# Patient Record
Sex: MALE | Race: WHITE
[De-identification: names, ages, dates, MRNs, and addresses within clinical notes are randomized per-mention and may not be internally consistent; named-entity substitution may affect disease eponyms.]

---

## 2020-06-02 ENCOUNTER — HOSPITAL ENCOUNTER (INPATIENT)
Dept: HOSPITAL 96 - M.ERS | Age: 40
LOS: 6 days | Discharge: HOME | DRG: 603 | End: 2020-06-08
Attending: FAMILY MEDICINE | Admitting: FAMILY MEDICINE
Payer: COMMERCIAL

## 2020-06-02 VITALS — BODY MASS INDEX: 27.83 KG/M2 | HEIGHT: 72.99 IN | WEIGHT: 210 LBS

## 2020-06-02 VITALS — DIASTOLIC BLOOD PRESSURE: 81 MMHG | SYSTOLIC BLOOD PRESSURE: 123 MMHG

## 2020-06-02 VITALS — SYSTOLIC BLOOD PRESSURE: 129 MMHG | DIASTOLIC BLOOD PRESSURE: 93 MMHG

## 2020-06-02 DIAGNOSIS — L97.519: ICD-10-CM

## 2020-06-02 DIAGNOSIS — F17.210: ICD-10-CM

## 2020-06-02 DIAGNOSIS — L03.116: ICD-10-CM

## 2020-06-02 DIAGNOSIS — B35.3: ICD-10-CM

## 2020-06-02 DIAGNOSIS — B95.61: ICD-10-CM

## 2020-06-02 DIAGNOSIS — B95.0: ICD-10-CM

## 2020-06-02 DIAGNOSIS — R00.0: ICD-10-CM

## 2020-06-02 DIAGNOSIS — E87.1: ICD-10-CM

## 2020-06-02 DIAGNOSIS — E11.621: ICD-10-CM

## 2020-06-02 DIAGNOSIS — E11.65: ICD-10-CM

## 2020-06-02 DIAGNOSIS — D72.829: ICD-10-CM

## 2020-06-02 DIAGNOSIS — L03.115: Primary | ICD-10-CM

## 2020-06-02 LAB
ABSOLUTE BASOPHILS: 0.1 THOU/UL (ref 0–0.2)
ABSOLUTE EOSINOPHILS: 0.4 THOU/UL (ref 0–0.7)
ABSOLUTE MONOCYTES: 1.1 THOU/UL (ref 0–1.2)
ALBUMIN SERPL-MCNC: 3.6 G/DL (ref 3.4–5)
ALP SERPL-CCNC: 74 U/L (ref 46–116)
ALT SERPL-CCNC: 26 U/L (ref 30–65)
ANION GAP SERPL CALC-SCNC: 7 MMOL/L (ref 7–16)
AST SERPL-CCNC: 17 U/L (ref 15–37)
BASOPHILS NFR BLD AUTO: 0.3 %
BILIRUB SERPL-MCNC: 0.7 MG/DL
BUN SERPL-MCNC: 14 MG/DL (ref 7–18)
CALCIUM SERPL-MCNC: 9 MG/DL (ref 8.5–10.1)
CHLORIDE SERPL-SCNC: 99 MMOL/L (ref 98–107)
CO2 SERPL-SCNC: 29 MMOL/L (ref 21–32)
CREAT SERPL-MCNC: 1.1 MG/DL (ref 0.6–1.3)
EOSINOPHIL NFR BLD: 2.3 %
GLUCOSE SERPL-MCNC: 167 MG/DL (ref 70–99)
GRANULOCYTES NFR BLD MANUAL: 78.7 %
HCT VFR BLD CALC: 49.6 % (ref 42–52)
HGB BLD-MCNC: 17.5 GM/DL (ref 14–18)
LYMPHOCYTES # BLD: 2 THOU/UL (ref 0.8–5.3)
LYMPHOCYTES NFR BLD AUTO: 12 %
MCH RBC QN AUTO: 31.5 PG (ref 26–34)
MCHC RBC AUTO-ENTMCNC: 35.3 G/DL (ref 28–37)
MCV RBC: 89.2 FL (ref 80–100)
MONOCYTES NFR BLD: 6.7 %
MPV: 7.9 FL. (ref 7.2–11.1)
NEUTROPHILS # BLD: 13.1 THOU/UL (ref 1.6–8.1)
NUCLEATED RBCS: 0 /100WBC
PLATELET COUNT*: 338 THOU/UL (ref 150–400)
POTASSIUM SERPL-SCNC: 3.9 MMOL/L (ref 3.5–5.1)
PROT SERPL-MCNC: 8.4 G/DL (ref 6.4–8.2)
RBC # BLD AUTO: 5.56 MIL/UL (ref 4.5–6)
RDW-CV: 13.8 % (ref 10.5–14.5)
SODIUM SERPL-SCNC: 135 MMOL/L (ref 136–145)
WBC # BLD AUTO: 16.6 THOU/UL (ref 4–11)

## 2020-06-03 VITALS — DIASTOLIC BLOOD PRESSURE: 70 MMHG | SYSTOLIC BLOOD PRESSURE: 133 MMHG

## 2020-06-03 VITALS — DIASTOLIC BLOOD PRESSURE: 73 MMHG | SYSTOLIC BLOOD PRESSURE: 121 MMHG

## 2020-06-03 NOTE — NUR
PATIENT RESTING IN BED. PATIENT HAS COMPLAINTS OF PAIN TO FEET, TREATED
ADEQUATELY WITH HYDROCODONE. PATIENT USES URINAL. PATIENT HAS GOOD APPETITE.
PATIENT DENIES ANY NEEDS AT THIS TIME. CALL LIGHT WITHIN REACH.

## 2020-06-03 NOTE — NUR
PATIENT UP FROM ER TO ROOM 119.  PT ALERT/ORIENTED X4, PLEASANT.  PT SAID
WOUNDS ON FEET STARTED THREE DAYS AGO AND PAIN BECAME INTOLERABLE SO CAM TO
HOSPITAL.  RT FOOT WITH RED, WARM EDEMA ON ANKLE AND TOP OF FOOT.  BOTTOM OF
FOOOT (BALL OF FOOT) WITH GREEN/YELLOW PURULENT WEEPING DRAINAGE.  LT FOOT
WITH WHAT APPEARS TO BE AN ATHELETE'S FOOT FUNGUS WITH GREEN/YELLOW WEEPING
DRAINAGE BETWEEN TOES.  FEET AND TOES CLEANED WITH WOUND SPRAY, PATTED DRY
WITH 4X4 GAUZE AND GAUZE PLACED OVER BALL OF FOOT ON RT FOOT, WRAPPED WITH
FLUFF GAUZE.  LT FOOT CLEANED WITH WOUND SPRAY, PATTED DRY AND DRIED BETWEEN
TOES.  4X4 GAUZE FOLDED AND PLACED BETWEEN DOES, WRAPPED WITH FLUFF GAUZE.  PT
INSTRUCTED TO CALL FOR ASSISTANCE PRIOR TO GETTING OUT OF BED.  PT ORIENTED TO
ROOM/POLICIES AND VERBALIZES UNDERSTANDING.  PT GIVEN TORADOL IN ER BUT DENIES
NEEDS FOR PAIN MEDS AT THIS TIME.  WILL CONTINUE TO MONITOR.

## 2020-06-04 VITALS — DIASTOLIC BLOOD PRESSURE: 82 MMHG | SYSTOLIC BLOOD PRESSURE: 136 MMHG

## 2020-06-04 VITALS — DIASTOLIC BLOOD PRESSURE: 79 MMHG | SYSTOLIC BLOOD PRESSURE: 121 MMHG

## 2020-06-04 VITALS — SYSTOLIC BLOOD PRESSURE: 133 MMHG | DIASTOLIC BLOOD PRESSURE: 84 MMHG

## 2020-06-04 LAB
EST. AVERAGE GLUCOSE BLD GHB EST-MCNC: 108 MG/DL
GLYCOHEMOGLOBIN (HGB A1C): 5.4 % (ref 4.8–5.6)

## 2020-06-04 NOTE — CON
[address]  [address]                    CONSULTATION                  
_______________________________________________________________________________
 
Name:       JOSE RAFAEL ALICEA                Room:           [address] [place]  
M.R.#:  [id number]      Account #:      [id number]  
Admission:  06/02/20     Attend Phys:    Jeanette Corcoran
Discharge:               Date of Birth:  [birth year]  
         Report #: [id number]
                                                                     [id number]  
_______________________________________________________________________________
THIS REPORT FOR:  //name//                      
 
cc:  MARGARETH Higuera family physician/PCP 
     MARGARETH Higuera family physician/PCP                                        ~
THIS REPORT FOR:  //name//                      
 
CC: MARGARETH physician/PCP
    Jeanette Anaya
 
DATE OF SERVICE:  06/03/2020
 
 
INFECTIOUS DISEASE CONSULTATION
 
ATTENDING PHYSICIAN:  Jeanette Anaya MD
 
REASON FOR EVALUATION:  Inflammatory process involving the left foot, suspected
skin and soft tissue infection with cellulitis, plantar ulcerations bilaterally.
 
HISTORY OF PRESENT ILLNESS:  Chart reviewed, patient examined.  This is a
40-year-old male who reportedly has not been aware of any significant past
medical history, who came to the ER complaining of right foot pain with
associated redness 3 days prior.  Denies any antecedent injury.  He has worked
driving a forklift.  He did note some generalized systemic illness with body
aches and chills, nausea day prior to admission.  Evaluation noted elevated
white count of 16.6, glucose of 167, lactic acid 1.9.  Plain film of the right
foot suggested plantar ulcer.  No bony abnormalities.  Blood cultures now with 1
out 2 Gram-positive orlando.  He is empirically started on antibiotics with
ceftriaxone and vancomycin.  He is not overtly toxic.  Denies any pulmonary or
gastrointestinal-related complaints.  No other exposure history that he is aware
of.
 
ALLERGIES:  None known.
 
CURRENT MEDICINES:  Include ceftriaxone, vancomycin, hydrocodone, ondansetron as
needed.
 
PAST MEDICAL HISTORY:  Otherwise, unremarkable.  There is question of some new
onset hyperglycemia.
 
SOCIAL HISTORY:  A 20-pack-year history of smoking 1 pack a day.  Does use
marijuana.  No injection drug use.  No ethanol.
 
FAMILY HISTORY:  Noncontributory.
 
REVIEW OF SYSTEMS:  Otherwise, unremarkable.
 
 
 
[place] [address]                    CONSULTATION                  
_______________________________________________________________________________
 
Name:       JOSE RAFAEL ALICEA                Room:           [address] [place]  
[facility]#:  [id number]      Account #:      [id number]  
Admission:  06/02/20     Attend Phys:    Jeanette Corcoran
Discharge:               Date of Birth:  [birth year]  
         Report #: [id number]
                                                                     [id number]  
_______________________________________________________________________________
 
PHYSICAL EXAMINATION:
GENERAL:  He is alert, cooperative, in mild distress.  He is generally lucid,
appears reasonably well nourished.
VITAL SIGNS:  Temperature 98.3, pulse 84, respirations 16, blood pressure
120/73.
SKIN:  Warm, dry.  He has extensive tattoos.
HEENT:  Normocephalic.  Extraocular muscles intact.
NECK:  Supple.
LUNGS:  Otherwise clear breath sounds.
HEART:  Regular.  I do not appreciate any murmur.
ABDOMEN:  Soft, nontender, no peritoneal signs.
EXTREMITIES:  Bilateral lower extremities have dressings in place.  Did review
the photographs taken earlier today because of  necrotic changes with blackened
eschar over the plantar aspect of the feet, has moderate degree of inflammation.
GENITOURINARY:  Deferred.
RECTAL:  Deferred.
 
LABORATORY DATA:  CBC:  White count 16.6, H and H 17.5 and 49.6, platelets of
338.  Differential unremarkable with the exception of neutrophilia. 
Electrolytes:  Sodium 135, potassium 3.9, chloride 99, bicarbonate is 29, anion
gap of 7, BUN and creatinine 14 and 1.1, glucose of 167.  LFTs unremarkable. 
Albumin 3.6, total protein of 8.4.  Lactic acid of 1.9.  Foot x-ray as described
above.  Blood culture 1 out 2 with Gram-positive orlando.
 
ASSESSMENT AND PLAN:  Bilateral distal lower extremity wounds complicated by
infection.  It certainly raises suspicion for some underlying issues such as
diabetes mellitus.  It is reasonable to check a hemoglobin A1c.  Continue wound
care as prescribed, empiric antimicrobial should give as reasonable coverage. 
At this point, he is not overtly toxic.  We will add incentive spirometry to his
overall regimen.  He may end up needing debridement of the wounds.
 
 
 
 
 
 
 
 
 
 
 
 
 
<ELECTRONICALLY SIGNED>
                                        By:  Jose Rafael De Paz MD           
06/04/20     1129
D: 06/03/20 1310_______________________________________
T: 06/03/20 1341Jojanell De Paz MD              /nt

## 2020-06-04 NOTE — NUR
Nutrition: Pt admitted with Rt foot cellulitis and Lt foot ulcer. Wt: 210#.
Vanc. Regular diet, good appetite. , albumin 3.6. Protein stores are
adequate, will not order protein supplement at this time. Appears
nutritionally stable. GOAL: continue good po and protein intake at meals,
control BG. Low risk.

## 2020-06-04 NOTE — NUR
WOUND NURSE:  PATIENT WAS SEEN BY DR. PITTMAN WHO MANAGED HIS WOUND AND DRESSING
CHANGE YESTERDAY.
PATIENT WAS NOT SUBSEQUENTLY SEEN BY THIS NURSE AS A RESULT.

## 2020-06-04 NOTE — NUR
PATIENT RESTING IN BED. PATIENT HAS COMPLAINTS OF PAIN TREATED ADEQUATELY WITH
PO PAIN MEDICATION. DRESSING REAPPLIED THIS AFTERNOON AFTER REMOVED BY
PHYSICIAN. ORDER RECEIVED FOR BREAKTHROUGH PAIN MEDICATION WITH DRESSING
CHANGES. PATIENT DENIES ANY NEEDS AT THIS TIME. CALL LIGHT WITHIN REACH.

## 2020-06-04 NOTE — NUR
PATIENT SLEPT WELL DURING THIS SHIFT.  VANCOMYACIN ALMOST COMPLETE WHEN IV IN
LT UPPER ARM INFUSED NEAR ELBOW.  NEW IV STARTED IN RT AC AND FLUIDS CONTINUED
AS ORDERED.  PT VOIDS DARK YELLOW URINE PER URINAL.  PT ENCOURAGED TO CALL IF
NEEDING TO GO TO BATHROOM FOR BM.  DSG ON RT FOOT C/D/I; LT FOOT LEONIDES.  PT DID
NOT ASK FOR PAIN MEDICATION.  PT IS PLEASANT, COOPERATIVE.  FREQUENTLY USED
ITEMS AND CALL LIGHT WITHIN REACH.  SIDERAILS UPX2.  WILL CONTINUE TO MONITOR.

## 2020-06-05 VITALS — SYSTOLIC BLOOD PRESSURE: 122 MMHG | DIASTOLIC BLOOD PRESSURE: 70 MMHG

## 2020-06-05 VITALS — SYSTOLIC BLOOD PRESSURE: 115 MMHG | DIASTOLIC BLOOD PRESSURE: 64 MMHG

## 2020-06-05 VITALS — DIASTOLIC BLOOD PRESSURE: 86 MMHG | SYSTOLIC BLOOD PRESSURE: 137 MMHG

## 2020-06-05 NOTE — NUR
SPOKE WITH PT.IN ROOM. HE SAID HE LIVES WITH [place] G[place]LFRIEND AND [place] HE IS PT.PAY. JUST GOT HIS JOB BACK AT [employer] BEFORE THIS SO NOT SURE IF
HE WILL HAVE IT WHEN HE IS ABLE TO GO BACK TO WORK. HE HAS CRUTCHES BUT ARE
TOO SHORT FOR HIM. TOLD HIM THE HOSPITAL COULD PROVIDE A PAIR FOR HIM BUT HE
WOULD HAVE TO SIGN PAPER THAT HE WOULD PAY FOR THEM. THEY WOULD BE PUT ON HIS
HOSPITAL BILL. HE COULD ALSO GET SOME POSSIBLY AT A [employer]. 
RECOMMENDS WOUND CARE. TOLD HIM IF HE QUALIFIED FOR [facility],
HE COUDL GO THERE FOR WOUND CARE. WILL PUT PHONE NUMBER ON DISCHARGE
INSTRUCTIONS FOR HIM TO CALL TO DISCUSS. HE THINKS HE CAN AFFORD HIS ORAL
ANTIBIOTIC AT DISCHARGE. HE WOULD LIKE NURSE TO CALL IN TO [facility] ON [address]
([place]) TO SEE HOW MUCH THEY WILL BE. HE SAID HIS MOM TOLD HIM SHE WOULD
PAY FOR THEM IF THEY ARE TOO MUCH FOR HIM. [phone].

## 2020-06-05 NOTE — NUR
PATIENT RESTING IN BED. PATIENT HAS COMPLAINTS OF PAIN TO FEET, TREATED
ADEQUTELY WITH MEDICATION AND REST. PATIENT SEEN BY DR PITTMAN THIS EVENING AND
DRESSING CHANGED TO RIGHT FOOT. PATIENT RECEIVING ANTIBIOTICS AS ORDERED.
PATIENT DENIES ANY NEEDS AT THIS TIME. CALL LIGHT WITHIN REACH.

## 2020-06-05 NOTE — NUR
ASSESSMENTS COMPLETED AT BEDSIDE, PLEASE REFER TO CHARTING. MEDICATIONS
ADMINISTERED PER MAR. HOURLY ROUNDING COMPLETED FOR SAFETY. PT IN BED WITH
CALL LIGHT WITHIN REACH. PT NON-WEIGHT BEARING, CALLS OUT APPROPRIATELY.

## 2020-06-06 VITALS — DIASTOLIC BLOOD PRESSURE: 76 MMHG | SYSTOLIC BLOOD PRESSURE: 141 MMHG

## 2020-06-06 VITALS — SYSTOLIC BLOOD PRESSURE: 135 MMHG | DIASTOLIC BLOOD PRESSURE: 75 MMHG

## 2020-06-06 VITALS — SYSTOLIC BLOOD PRESSURE: 142 MMHG | DIASTOLIC BLOOD PRESSURE: 73 MMHG

## 2020-06-06 LAB
ABSOLUTE BASOPHILS: 0.1 THOU/UL (ref 0–0.2)
ABSOLUTE EOSINOPHILS: 0.6 THOU/UL (ref 0–0.7)
ABSOLUTE MONOCYTES: 0.7 THOU/UL (ref 0–1.2)
ALBUMIN SERPL-MCNC: 3 G/DL (ref 3.4–5)
ALP SERPL-CCNC: 63 U/L (ref 46–116)
ALT SERPL-CCNC: 37 U/L (ref 30–65)
ANION GAP SERPL CALC-SCNC: 5 MMOL/L (ref 7–16)
AST SERPL-CCNC: 28 U/L (ref 15–37)
BASOPHILS NFR BLD AUTO: 0.9 %
BILIRUB SERPL-MCNC: 0.3 MG/DL
BUN SERPL-MCNC: 15 MG/DL (ref 7–18)
CALCIUM SERPL-MCNC: 8.5 MG/DL (ref 8.5–10.1)
CHLORIDE SERPL-SCNC: 103 MMOL/L (ref 98–107)
CO2 SERPL-SCNC: 31 MMOL/L (ref 21–32)
CREAT SERPL-MCNC: 1 MG/DL (ref 0.6–1.3)
EOSINOPHIL NFR BLD: 4.5 %
GLUCOSE SERPL-MCNC: 78 MG/DL (ref 70–99)
GRANULOCYTES NFR BLD MANUAL: 70.8 %
HCT VFR BLD CALC: 49.1 % (ref 42–52)
HGB BLD-MCNC: 17 GM/DL (ref 14–18)
LYMPHOCYTES # BLD: 2.2 THOU/UL (ref 0.8–5.3)
LYMPHOCYTES NFR BLD AUTO: 18 %
MCH RBC QN AUTO: 31.4 PG (ref 26–34)
MCHC RBC AUTO-ENTMCNC: 34.7 G/DL (ref 28–37)
MCV RBC: 90.7 FL (ref 80–100)
MONOCYTES NFR BLD: 5.8 %
MPV: 7.2 FL. (ref 7.2–11.1)
NEUTROPHILS # BLD: 8.6 THOU/UL (ref 1.6–8.1)
NUCLEATED RBCS: 0 /100WBC
PLATELET COUNT*: 376 THOU/UL (ref 150–400)
POTASSIUM SERPL-SCNC: 4.5 MMOL/L (ref 3.5–5.1)
PROT SERPL-MCNC: 7.8 G/DL (ref 6.4–8.2)
RBC # BLD AUTO: 5.41 MIL/UL (ref 4.5–6)
RDW-CV: 13.7 % (ref 10.5–14.5)
SODIUM SERPL-SCNC: 139 MMOL/L (ref 136–145)
WBC # BLD AUTO: 12.2 THOU/UL (ref 4–11)

## 2020-06-06 NOTE — NUR
PT SLEPT OFF AND ON OVERNIGHT. USING URINAL TO VOID WITHOUT DIFFICULTY. PO
PAIN MED GIVEN AT HS WITH GOOD RESULT. DRSG CDI TO R FOOT. RFA IVF INFUSING
PER PUMP, ABX GIVEN AS ORDERED.  NO LABS THIS MORNING. CM FOLLOWING AS PT WILL
NEED WOUND CARE OUTPT. ABLE TO USE CALL LITE AND MAKE NEEDS KNOWN.

## 2020-06-06 NOTE — NUR
PATIENT VOIDING PER URINAL. IV SL THIS EVENING PER ORDERS. DR. BROWNING AND DR. BARDALES NOTIFIED OF FOOT WOUND CULTURE RESULTS. DR. BROWNING DC'D PCN IV AND
ORDERED CEFAZOLIN. PATIENT AWARE OF NEW PLAN OF CARE. DRESSING TO RIGHT FOOT
INTACT, SPOKE WITH DR. PITTMAN AND WILL SEE PATIENT THIS EVENING AND CHANGE
DRESSING. PATIENT HAS PERSONAL PHONE AND IS IN CONTACT WITH FAMILY ON PLAN OF
CARE.

## 2020-06-07 VITALS — SYSTOLIC BLOOD PRESSURE: 112 MMHG | DIASTOLIC BLOOD PRESSURE: 53 MMHG

## 2020-06-07 VITALS — DIASTOLIC BLOOD PRESSURE: 72 MMHG | SYSTOLIC BLOOD PRESSURE: 134 MMHG

## 2020-06-07 NOTE — NUR
PATIENT UP AND SHOWERED THIS AM. WOUND DRESSING TO FOOT CHANGED PER PROTOCOL
AND SUNDAY PHOTO OBTAINED. IV ABX GIVEN AS ORDERED, OTHERWISE IV SL. PATIENT
VOIDING LARGE AMOUNTS OF URINE PER URINAL. POST OP SHOE ORDERED FOR PATIENT.
POSSIBLE DISCHARGE TOMORROW.

## 2020-06-07 NOTE — NUR
PT ALERT AND ORIENTED. PT SLEPT WELL THIS SHIFT. MEDS GIVEN PER EMAR. PT
DENIED PAIN THIS SHIFT. DRESSING C/D/I. FALL PRECAUTION IN PLACE. HOURLY
ROUNDIGNS MADE. WILL CONTINUE TO MONITOR.

## 2020-06-08 VITALS — SYSTOLIC BLOOD PRESSURE: 137 MMHG | DIASTOLIC BLOOD PRESSURE: 86 MMHG

## 2020-06-08 VITALS — DIASTOLIC BLOOD PRESSURE: 86 MMHG | SYSTOLIC BLOOD PRESSURE: 137 MMHG

## 2020-06-08 VITALS — DIASTOLIC BLOOD PRESSURE: 68 MMHG | SYSTOLIC BLOOD PRESSURE: 122 MMHG

## 2020-06-08 LAB
ANION GAP SERPL CALC-SCNC: 8 MMOL/L (ref 7–16)
BUN SERPL-MCNC: 21 MG/DL (ref 7–18)
CALCIUM SERPL-MCNC: 8.7 MG/DL (ref 8.5–10.1)
CHLORIDE SERPL-SCNC: 102 MMOL/L (ref 98–107)
CO2 SERPL-SCNC: 28 MMOL/L (ref 21–32)
CREAT SERPL-MCNC: 1.1 MG/DL (ref 0.6–1.3)
GLUCOSE SERPL-MCNC: 94 MG/DL (ref 70–99)
HCT VFR BLD CALC: 50.1 % (ref 42–52)
HGB BLD-MCNC: 17.7 GM/DL (ref 14–18)
MCH RBC QN AUTO: 31.6 PG (ref 26–34)
MCHC RBC AUTO-ENTMCNC: 35.3 G/DL (ref 28–37)
MCV RBC: 89.5 FL (ref 80–100)
MPV: 7.3 FL. (ref 7.2–11.1)
PLATELET COUNT*: 380 THOU/UL (ref 150–400)
POTASSIUM SERPL-SCNC: 4.3 MMOL/L (ref 3.5–5.1)
RBC # BLD AUTO: 5.6 MIL/UL (ref 4.5–6)
RDW-CV: 13.7 % (ref 10.5–14.5)
SODIUM SERPL-SCNC: 138 MMOL/L (ref 136–145)
WBC # BLD AUTO: 12.2 THOU/UL (ref 4–11)

## 2020-06-08 NOTE — NUR
PATIENT DISCHARGED FROM UNIT AT 1537. ALERT AND ORIENTED X 4. VITAL SIGNS
STABLE ON ROOM AIR. UP WITH ASSISTANCE TO WHEELCHAIR. ORTHO SHOE IN PLACE TO
RIGHT FOOT. DRESSING CLEAN DRY AND INTACT. IV DISCONTINUED. DENIES PAIN AND
NAUSEA AT THIS TIME. MEDICATION INFORMATION AND DISCHARGE INSTRUCTIONS GIVEN
TO PATIENT. LEFT WITH ALL BELONGINGS. PATIENT LEFT WITH  VIA CAR.

## 2020-06-08 NOTE — NUR
PT SLEPT WELL THIS SHIFT. MEDS GIVEN PER EMAR. DR HANON CALLED TO CONFIRM I&D
TODAY @ NOON. DAYSHIFT NURSE INFORMED. CALL LIGHT WITHIN REACH. HOURLY
ROUNDINGS MADE. WILL CONTINUE TO MONITOR.

## 2020-06-08 NOTE — NUR
WOUND NURSE:  ASSISTED DR. PITTMAN WITH DRESSING CHANGE AFTER PERFORMED I&D ON
PATIENT'S AFFECTED FOOT.  PATIENT DISCHARGING FROM ACUTE CARE AND TO FOLLOW UP
AT [facility].

## 2020-06-17 NOTE — CON
[address]  [address]                    CONSULTATION                  
_______________________________________________________________________________
 
Name:       BILLY ALICEA                Room:           [address] [place]  
M.R.#:  [id number]      Account #:      [id number]  
Admission:  06/02/20     Attend Phys:    Jeanette Corcoran
Discharge:  06/08/20     Date of Birth:  [birth year]  
         Report #: [id number]
                                                                     [id number]  
_______________________________________________________________________________
THIS REPORT FOR:  //name//                      
 
cc:  MARGARETH Higuera family physician/PCP 
     MARGARETH - No family physician/PCP                                        ~
THIS REPORT FOR:  //name//                      
 
CC: MARGARETH physician/PCP
    Jeanette Anaya
 
DATE OF SERVICE:  06/08/2020
 
 
CHIEF COMPLAINT AND HISTORY OF PRESENT ILLNESS:  Followup of ulceration with
cellulitis to the right plantar foot that grew methicillin-sensitive Staph
aureus and group A streptococcus.  He is on parenteral cefazolin with good
tolerance with planned discharge today on cephalexin.  He has been afebrile,
relates decreased right plantar foot pain.  He maintains a good appetite.
 
PHYSICAL EXAMINATION:  There is a substantial decrease in inflammation to the
right foot with complete healing of the right plantar forefoot ulcerations.  No
deion cellulitis to the right distal plantar foot.  The inflamed bulla at the
right dorsal medial hindfoot is substantially less inflamed and more of a purple
color versus bright red, as it had been.  Foot is warm with palpable pedal
pulses, no signs of acute vascular embarrassment.  Left foot lesions are dry and
healed without inflammation.
 
LABORATORY DATA:  WBC 12.2, RBC 5.60, hemoglobin 17.7, hematocrit 50.1 and
platelets 380.  BUN 21, creatinine 1.1, glucose 94.  Albumin 3.0.
 
IMPRESSION:  Resolving cellulitis bilateral feet with healed plantar forefoot
ulcerations and bulla to the right dorsal medial hindfoot.
 
PLAN:  I performed an informed consent and injected 6 mL of 1% lidocaine with
epinephrine proximal to the bulla to the right hindfoot.  Anesthesia was
adequate and I made a 1 cm incision over the bulla and drained roughly 2 mL of
sanguinous fluid with no visualized purulence.  The wound was cleansed and
dressed with Aquacel Ag, 4 x 4, and Kerlix gauze.  The patient to ambulate in a
surgical shoe and bathe his feet daily and lotion.  I will follow up with him
next week in my office or he can go to [facility].
 
 
 
 
 
<ELECTRONICALLY SIGNED>
                                        By:  Kaden Zelaya DPM          
06/17/20     1307
D: 06/08/20 1217_______________________________________
T: 06/08/20 1239Dafabian Zelaya DPM             /nt

## 2020-06-17 NOTE — CON
[address]  [address]                    CONSULTATION                  
_______________________________________________________________________________
 
Name:       BILLY ALICEA                Room:           [address] [place]  
M.R.#:  [id number]      Account #:      [id number]  
Admission:  06/02/20     Attend Phys:    Jeanette Corcoran
Discharge:  06/08/20     Date of Birth:  [birth year]  
         Report #: [id number]
                                                                     [id number]  
_______________________________________________________________________________
THIS REPORT FOR:  //name//                      
 
cc:  MARGARETH Higuera family physician/PCP 
     MARGARETH - Davida family physician/PCP                                        ~
THIS REPORT FOR:  //name//                      
 
CC: MARGARETH physician/PCP
    Jeanette Anaya
 
DATE OF SERVICE:  06/05/2020
 
 
CHIEF COMPLAINT AND HISTORY OF PRESENT ILLNESS:  Followup of ulceration to right
plantar forefoot and interdigital spaces growing group A strep and
methicillin-sensitive Staph aureus.  He is on parenteral penicillin G.  He has
good appetite, he has been afebrile with stable vitals during admission.  His
pain is decreased, but still fairly advanced, which he rates as 7/10.  He has
remained nonweightbearing during his hospitalization.  There are no new labs for
review.
 
PHYSICAL EXAMINATION:  There is decreased inflammation to the right plantar
forefoot with slightly less slough.  There is a large area of ulceration to the
forefoot encompassing the first through fifth metatarsophalangeal joint region
extending into the interdigital spaces.  There is no exposed plantar fascia,
tendon, bone or joint.  There is an erythematous region to the right anterior
medial hindfoot overlying the talonavicular joint region with no open
ulceration.  The left foot has some dry scabs to the plantar forefoot and
interdigital spaces with no inflammation or drainage.  No signs of vascular
embarrassment with strong pedal pulses to both extremities.
 
IMPRESSION:  Right forefoot ulceration with cellulitis.
 
PLAN:  The wound was cleansed, dried and dressed with petroleum Xeroform, ABD
and Kerlix gauze.  The patient may transition to partial weightbearing to the
heel with crutches as tolerated.  If this is not tolerated, then he is to remain
nonweightbearing.  I recommend he followup with [facility]
due to lack of health care insurance, although I will be happy to see him in my
office as I discussed with him.
 
 
 
 
 
 
<ELECTRONICALLY SIGNED>
                                        By:  Kaden Zelaya DPM          
06/17/20     1307
D: 06/05/20 1656_______________________________________
T: 06/05/20 2023Dtree Zelaya DPM             /nt Yes

## 2020-06-17 NOTE — CON
[address]  [address]                    CONSULTATION                  
_______________________________________________________________________________
 
Name:       BILLY ALICEA                Room:           [address] [place]  
M.R.#:  [id number]      Account #:      [id number]  
Admission:  06/02/20     Attend Phys:    Jeanette Corcoran
Discharge:  06/08/20     Date of Birth:  [birth year]  
         Report #: [id number]
                                                                     [id number]  
_______________________________________________________________________________
THIS REPORT FOR:  //name//                      
 
cc:  MARGARETH Higuera family physician/PCP 
     MARGARETH - No family physician/PCP                                        ~
THIS REPORT FOR:  //name//                      
 
CC: MARGARETH physician/PCP
    Jeanette Anaya
 
DATE OF SERVICE:  06/06/2020
 
 
CHIEF COMPLAINT AND HISTORY OF PRESENT ILLNESS:  Follow up right plantar
forefoot ulceration with cellulitis, etiology unknown.  Cultures grew
methicillin-sensitive Staph aureus and group A streptococcus.  His antibiotics
were switched from penicillin to the cefazolin today.  Pain is roughly the same,
he has been afebrile, good appetite, remains nonweightbearing to the foot.
 
PHYSICAL EXAMINATION:  Decreased inflammation to the right distal forefoot with
open ulceration at ____ bulla to the right dorsal medial hindfoot that is tender
to palpation.  No signs of acute vascular embarrassment.  No exposed bone,
tendon or joint to the right plantar forefoot with less pale slough.
 
IMPRESSION:  Right plantar forefoot ulceration with cellulitis.
 
PLAN:  The right foot wound was cleansed and dried and dressed with Xeroform,
ABD and Kerlix gauze.  The patient may perform heel weightbearing in a surgical
shoe with crutches.
 
 
 
 
 
 
 
 
 
 
 
 
 
 
 
 
<ELECTRONICALLY SIGNED>
                                        By:  Kaden Zelaya DPM          
06/17/20     1307
D: 06/06/20 1701_______________________________________
T: 06/06/20 2141Dtree Zelaya DPM             /nt

## 2020-06-17 NOTE — CON
[address]  [address]                    CONSULTATION                  
_______________________________________________________________________________
 
Name:       BILLY ALICEA                Room:           [address] [place]  
M.R.#:  [id number]      Account #:      [id number]  
Admission:  06/02/20     Attend Phys:    Jeanette Corcoran
Discharge:  06/08/20     Date of Birth:  [birth year]  
         Report #: [id number]
                                                                     [id number]  
_______________________________________________________________________________
THIS REPORT FOR:  //name//                      
 
cc:  MARGARETH Higuera family physician/PCP 
     MARGARETH - Davida family physician/PCP                                        ~
THIS REPORT FOR:  //name//                      
 
CC: MARGARETH physician/PCP
    Jeanette Anaya
 
DATE OF SERVICE:  06/03/2020
 
 
ADMISSION DIAGNOSIS:  Diabetic ulceration, right foot with cellulitis.
 
HISTORY OF PRESENT ILLNESS:  The patient is a 40-year-old male admitted for
worsening cellulitis to the right plantar foot.  He first noticed some open
lesions roughly 4 days ago of unknown etiology.  He denies walking barefoot on
hot surfaces, submersion, blisters or any type of trauma to the foot.  He has
several wounds to the left first, second, third interdigital spaces.  He is on
parenteral vancomycin and ceftriaxone.  Admission wound cultures growing
gram-negative rods, gram-positive rods, and gram-positive cocci.  Blood cultures
pending with no growth so far.  He has high-grade pain to the right foot,
exquisitely painful to light touch.  He has been afebrile  since admission. 
X-rays were negative for subcutaneous emphysema or signs of osteomyelitis.
 
LABORATORY DATA:  WBC 16.6, RBC 5.56, hemoglobin 17.5, hematocrit 49.6,
platelets 338.  BUN 14, creatinine 1.1, glucose 167.
 
PHYSICAL EXAMINATION:  Large full thickness ulceration to the right plantar
forefoot encompassing nearly the entire ball of the foot.  The wound is roughly
40% granulation and 60% yellowish pale slough.  There is localized inflammation
to the plantar foot extending to the dorsal aspect and lower ankle consistent
with cellulitis.  The foot is exquisitely tender to the touch.  He has several
full thickness ulcerations to the interdigital spaces of the left foot with
minimal inflammation and no deion cellulitis.  He has palpable dorsalis pedis
and posterior tibial pulses bilaterally with immediate digital capillary refill.
 
IMPRESSION:  Bilateral foot ulcerations, right is severely involved including
most of the ball of the foot.
 
PLAN:  I do not anticipate any surgical debridement.  Continue parenteral
antibiotics and await culture results.  I cleansed the right foot wound and
dressed it with petroleum Xeroform gauze, ABD and Kerlix gauze.  The patient to
remain strictly nonweightbearing to the right foot.  Once his pain subsides in a
 
 
 
[place] [address]                    CONSULTATION                  
_______________________________________________________________________________
 
Name:       BILLY ALICEA                Room:           [address] [place]  
M.R.#:  [id number]      Account #:      [id number]  
Admission:  06/02/20     Attend Phys:    Jeanette Corcoran
Discharge:  06/08/20     Date of Birth:  [birth year]  
         Report #: [id number]
                                                                     [id number]  
_______________________________________________________________________________
day or 2, we will transition to partial weightbearing to the heel in a surgical
shoe with crutches.
 
 
 
 
 
 
 
 
 
 
 
 
 
 
 
 
 
 
 
 
 
 
 
 
 
 
 
 
 
 
 
 
 
 
 
 
 
 
 
 
 
 
<ELECTRONICALLY SIGNED>
                                        By:  Kaden Zelaya DPM          
06/17/20     1307
D: 06/03/20 1547_______________________________________
T: 06/03/20 2116Kaden Zelaya DPM             /nt